# Patient Record
Sex: MALE | Race: WHITE | URBAN - METROPOLITAN AREA
[De-identification: names, ages, dates, MRNs, and addresses within clinical notes are randomized per-mention and may not be internally consistent; named-entity substitution may affect disease eponyms.]

---

## 2017-10-19 ENCOUNTER — TELEPHONE (OUTPATIENT)
Dept: PEDIATRICS | Facility: CLINIC | Age: 22
End: 2017-10-19

## 2017-10-19 NOTE — TELEPHONE ENCOUNTER
Forms received from [employer] for Mary Booth M.D..  Forms placed in provider 'sign me' folder.  Please fax forms to [phone] after completion.    Gloria Perdomo